# Patient Record
Sex: FEMALE | URBAN - NONMETROPOLITAN AREA
[De-identification: names, ages, dates, MRNs, and addresses within clinical notes are randomized per-mention and may not be internally consistent; named-entity substitution may affect disease eponyms.]

---

## 2023-08-14 ENCOUNTER — TELEPHONE (OUTPATIENT)
Age: 33
End: 2023-08-14

## 2023-08-14 NOTE — TELEPHONE ENCOUNTER
Called Dr Campbell office spoke with Jayleen I asked would she fax over most recent office notes she stated that she would.

## 2024-10-10 ENCOUNTER — TELEPHONE (OUTPATIENT)
Dept: UROLOGY | Facility: CLINIC | Age: 34
End: 2024-10-10
Payer: COMMERCIAL

## 2024-10-10 DIAGNOSIS — N20.0 RENAL STONE: Primary | ICD-10-CM

## 2024-10-10 NOTE — TELEPHONE ENCOUNTER
Spoke with patient and she is going to get the Disc for her appointment tomorrow. I let patient know that Andrés had not shared the image to us yet. And patient would need those images to be seen. Patient verbalized understanding.

## 2024-10-10 NOTE — PROGRESS NOTES
Subjective    Ms. Villa is 34 y.o. female    Chief Complaint: 4 mm left distal ureteral stone    History of Present Illness    34-year-old female new patient referred for new finding of 4 mm left distal ureteral stone causing mild to moderate hydronephrosis after patient presented to [facility] within the last week due to new onset left-sided flank pain.  Denies prior personal history of kidney stones.  Reports a history in her family.  Patient reports shortly after returning home from ER passed the stone and is now only experiencing a mild soreness along the left side.    The following portions of the patient's history were reviewed and updated as appropriate: allergies, current medications, past family history, past medical history, past social history, past surgical history and problem list.    Review of Systems   Constitutional:  Negative for chills and fever.   Gastrointestinal:  Negative for abdominal pain, anal bleeding and blood in stool.   Genitourinary:  Positive for flank pain. Negative for dysuria and hematuria.         Current Outpatient Medications:     acyclovir (ZOVIRAX) 200 MG capsule, Take 2 capsules by mouth 2 (Two) Times a Day., Disp: , Rfl:     Klor-Con M10 10 MEQ CR tablet, TAKE 1 TABLET BY MOUTH EVERY DAY WITH FOOD FOR 3 DAYS, Disp: , Rfl:     ketorolac (TORADOL) 10 MG tablet, Take 1 tablet by mouth Every 6 (Six) Hours. (Patient not taking: Reported on 10/11/2024), Disp: , Rfl:     ondansetron ODT (ZOFRAN-ODT) 4 MG disintegrating tablet, DISSOLVE 1 TABLET ON THE TONGUE EVERY 6 HOURS AS NEEDED (Patient not taking: Reported on 10/11/2024), Disp: , Rfl:     History reviewed. No pertinent past medical history.    History reviewed. No pertinent surgical history.    Social History     Socioeconomic History    Marital status:    Tobacco Use    Smoking status: Never     Passive exposure: Never    Smokeless tobacco: Never   Vaping Use    Vaping status: Never Used       History  reviewed. No pertinent family history.    Objective    There were no vitals taken for this visit.    Physical Exam  Nursing note reviewed.   Constitutional:       General: She is not in acute distress.     Appearance: Normal appearance. She is not ill-appearing.   HENT:      Nose: No congestion.   Abdominal:      Tenderness: There is no right CVA tenderness or left CVA tenderness.      Hernia: No hernia is present.   Skin:     General: Skin is warm and dry.   Neurological:      Mental Status: She is alert and oriented to person, place, and time.   Psychiatric:         Mood and Affect: Mood normal.         Behavior: Behavior normal.             Results for orders placed or performed in visit on 10/11/24   POC Urinalysis Dipstick, Multipro    Specimen: Urine   Result Value Ref Range    Color Yellow Yellow, Straw, Dark Yellow, Fiona    Clarity, UA Clear Clear    Glucose, UA Negative Negative mg/dL    Bilirubin Negative Negative    Ketones, UA Negative Negative    Specific Gravity  1.010 1.005 - 1.030    Blood, UA Negative Negative    pH, Urine 7.0 5.0 - 8.0    Protein, POC Negative Negative mg/dL    Urobilinogen, UA 0.2 E.U./dL Normal, 0.2 E.U./dL    Nitrite, UA Negative Negative    Leukocytes Negative Negative   KUB independent review    A KUB is available for me to review today.  The image is inspected for a bowel gas pattern and the general bone structure of the spine and pelvis. The kidneys are then inspected closely.  Renal outline is noted if identifiable. The kidney, collecting system, and anticipated path of the ureter are examined for calcifications including those in the true pelvis.  This film reveals:    On the right there are no calcificaitons seen in the kidney or the expected course of the ureter. .    On the left there are no calcificaitons seen in the kidney or the expected course of the ureter. .  Independent review of a CT scan of abdomen and pelvis without contrast  The CT scan of the abdomen/pelvis  done without contrast is available for me to review.  Treatment recommendations require an independent review.  First I scanned the liver, spleen, and bowel pattern.  The retroperitoneum including the major vessels and lymphatic packages are briefly reviewed.  This film has been reviewed by the radiologist to determine any nonurologic abnormalities that are present.  The kidneys are closely inspected for size, symmetry, contour, parenchymal thickness, perinephric reaction, presence of calcifications, and intrarenal dilation of the collecting system.  The ureters are inspected for their course, caliber, and any calcifications.  The bladder is inspected for its thickness, size, and presence of any calcifications.  This scan shows mild to moderate left-sided hydronephrosis due to a 4 mm left distal ureteral obstructing stone.  Patient also appears to have a approximately 3 mm stone in the left lower pole  Assessment and Plan    Diagnoses and all orders for this visit:    1. Renal stone (Primary)  -     POC Urinalysis Dipstick, Multipro  -     Tissue Pathology Exam; Future      KUB today ureteral stone not visualized    Will send stone for tissue pathology as patient has provided stone fragment    Will have patient return in 3 weeks to make sure all swelling has resolved would like for patient to undergo a renal ultrasound prior

## 2024-10-10 NOTE — TELEPHONE ENCOUNTER
Spoke with the patient and let her know that, while we have requested her CT images from [facility], we have not received them as of yet, so it would be best for her to bring the physical disc for her appointment tomorrow. She confirmed understanding.

## 2024-10-11 ENCOUNTER — OFFICE VISIT (OUTPATIENT)
Dept: UROLOGY | Facility: CLINIC | Age: 34
End: 2024-10-11
Payer: COMMERCIAL

## 2024-10-11 ENCOUNTER — HOSPITAL ENCOUNTER (OUTPATIENT)
Dept: GENERAL RADIOLOGY | Facility: HOSPITAL | Age: 34
Discharge: HOME | End: 2024-10-11
Payer: COMMERCIAL

## 2024-10-11 DIAGNOSIS — N20.0 RENAL STONE: ICD-10-CM

## 2024-10-11 DIAGNOSIS — N20.0 RENAL STONE: Primary | ICD-10-CM

## 2024-10-11 LAB
BILIRUB BLD-MCNC: NEGATIVE MG/DL
CLARITY, POC: CLEAR
COLOR UR: YELLOW
GLUCOSE UR STRIP-MCNC: NEGATIVE MG/DL
KETONES UR QL: NEGATIVE
LEUKOCYTE EST, POC: NEGATIVE
NITRITE UR-MCNC: NEGATIVE MG/ML
PH UR: 7 [PH] (ref 5–8)
PROT UR STRIP-MCNC: NEGATIVE MG/DL
RBC # UR STRIP: NEGATIVE /UL
SP GR UR: 1.01 (ref 1–1.03)
UROBILINOGEN UR QL: NORMAL

## 2024-10-11 PROCEDURE — 82360 CALCULUS ASSAY QUANT: CPT

## 2024-10-11 PROCEDURE — 88300 SURGICAL PATH GROSS: CPT

## 2024-10-11 PROCEDURE — 74018 RADEX ABDOMEN 1 VIEW: CPT

## 2024-10-11 RX ORDER — POTASSIUM CHLORIDE 750 MG/1
TABLET, EXTENDED RELEASE ORAL
COMMUNITY
Start: 2024-10-07

## 2024-10-11 RX ORDER — ACYCLOVIR 200 MG/1
2 CAPSULE ORAL 2 TIMES DAILY
COMMUNITY

## 2024-10-11 RX ORDER — KETOROLAC TROMETHAMINE 10 MG/1
1 TABLET, FILM COATED ORAL EVERY 6 HOURS
COMMUNITY
Start: 2024-10-07

## 2024-10-11 RX ORDER — ONDANSETRON 4 MG/1
TABLET, ORALLY DISINTEGRATING ORAL
COMMUNITY
Start: 2024-10-07

## 2024-10-15 LAB
LAB AP CASE REPORT: NORMAL
Lab: NORMAL
PATH REPORT.FINAL DX SPEC: NORMAL
PATH REPORT.GROSS SPEC: NORMAL

## 2024-10-22 NOTE — PROGRESS NOTES
Subjective    Ms. Villa is 34 y.o. female    Chief Complaint: follow up with renal US prior given recent hx of 4mm left distal ureteral stone    History of Present Illness    34-year-old female established pt in for follow up with renal us as pt was previously found to have a 4 mm left distal ureteral stone with mild to moderate hydronephrosis. Pt past stone. Wanted to make sure hydronephrosis had resolved as pt was still experience slight discomfort. Denies prior personal history of kidney stones.  Reports a history in her family.      The following portions of the patient's history were reviewed and updated as appropriate: allergies, current medications, past family history, past medical history, past social history, past surgical history and problem list.    Review of Systems   Constitutional:  Negative for chills and fever.   Gastrointestinal:  Negative for abdominal pain, anal bleeding and blood in stool.   Genitourinary:  Negative for dysuria and hematuria.         Current Outpatient Medications:     acyclovir (ZOVIRAX) 200 MG capsule, Take 2 capsules by mouth 2 (Two) Times a Day., Disp: , Rfl:     Klor-Con M10 10 MEQ CR tablet, TAKE 1 TABLET BY MOUTH EVERY DAY WITH FOOD FOR 3 DAYS, Disp: , Rfl:     potassium chloride ER (K-TAB) 20 MEQ tablet controlled-release ER tablet, Take 1 tablet by mouth Daily., Disp: , Rfl:     vitamin C (ASCORBIC ACID) 250 MG tablet, Take 1 tablet by mouth Daily., Disp: , Rfl:     ketorolac (TORADOL) 10 MG tablet, Take 1 tablet by mouth Every 6 (Six) Hours. (Patient not taking: Reported on 11/1/2024), Disp: , Rfl:     ondansetron ODT (ZOFRAN-ODT) 4 MG disintegrating tablet, DISSOLVE 1 TABLET ON THE TONGUE EVERY 6 HOURS AS NEEDED (Patient not taking: Reported on 11/1/2024), Disp: , Rfl:     History reviewed. No pertinent past medical history.    History reviewed. No pertinent surgical history.    Social History     Socioeconomic History    Marital status:    Tobacco Use     Smoking status: Never     Passive exposure: Never    Smokeless tobacco: Never   Vaping Use    Vaping status: Never Used       History reviewed. No pertinent family history.    Objective    There were no vitals taken for this visit.    Physical Exam        Results for orders placed or performed in visit on 10/11/24   POC Urinalysis Dipstick, Multipro    Collection Time: 10/11/24 10:21 AM    Specimen: Urine   Result Value Ref Range    Color Yellow Yellow, Straw, Dark Yellow, Fiona    Clarity, UA Clear Clear    Glucose, UA Negative Negative mg/dL    Bilirubin Negative Negative    Ketones, UA Negative Negative    Specific Gravity  1.010 1.005 - 1.030    Blood, UA Negative Negative    pH, Urine 7.0 5.0 - 8.0    Protein, POC Negative Negative mg/dL    Urobilinogen, UA 0.2 E.U./dL Normal, 0.2 E.U./dL    Nitrite, UA Negative Negative    Leukocytes Negative Negative   Tissue Pathology Exam    Collection Time: 10/11/24 10:55 AM    Specimen: Ureter, Left; Tissue   Result Value Ref Range    Note to Patients       This report may contain a detailed description of human tissue sent by a health care provider to the laboratory for pathologic evaluation. The content of this report is essential for diagnosis and may provide important critical findings. This information may be unfamiliar to patients to review without a medical professional present. It is advised that the patient review this report in the presence of a health care provider who can answer questions and explain the results.      Case Report       Surgical Pathology Report                         Case: [id number]                                  Authorizing Provider:  Josiane Sheth APRN  Collected:           10/11/2024 10:55 AM          Ordering Location:     [facility]     Received:            10/11/2024 10:55 AM                                 [facility]                                                                Pathologist:           Dannie  Corey Vincent MD                                                      Specimen:    Ureter, Left                                                                               Final Diagnosis       Left ureter stone (gross only):  Renal lithiasis.  The specimen will be sent for x-ray crystal analysis and an outside addendum report will follow.      Gross Description       1. Ureter, Left.  Received in the unfixed state in a container labeled with the patient's name and date of birth and designated ureter L is an irregular fragment of hard tan material measuring approximately 0.3 x 0.2 x 0.1 cm.  No sections are taken.       Independent renal ultrasound review  The renal ultrasound is available for me to review.  Treatment recommendations require an independent review.  This film has been reviewed by the radiologist to determine any non urologic abnormalities that are presents.  I inspected the kidneys for size, symmetry, contour, parenchymal thickness, perinephric reaction, presence of calcifications, and intrarenal dilation of the collecting system.  This US shows no hydronephrosis, no stones seen  Assessment and Plan    Diagnoses and all orders for this visit:    1. Renal stone (Primary)  -     Cancel: POC Urinalysis Dipstick, Multipro      Awaiting tissue pathology results     Renal US hydronephrosis has resolved  No other stones seen    Previous ct revealing left upper pole renal stone small    Will continue to follow stone burden     Follow up 1 year

## 2024-11-01 ENCOUNTER — OFFICE VISIT (OUTPATIENT)
Dept: UROLOGY | Facility: CLINIC | Age: 34
End: 2024-11-01
Payer: COMMERCIAL

## 2024-11-01 DIAGNOSIS — N20.0 RENAL STONE: Primary | ICD-10-CM

## 2024-11-01 RX ORDER — MULTIVIT WITH MINERALS/LUTEIN
250 TABLET ORAL DAILY
COMMUNITY

## 2024-11-01 RX ORDER — POTASSIUM CHLORIDE 1500 MG/1
1 TABLET, EXTENDED RELEASE ORAL DAILY
COMMUNITY
Start: 2024-10-24
